# Patient Record
Sex: MALE | Race: WHITE | NOT HISPANIC OR LATINO | Employment: OTHER | URBAN - METROPOLITAN AREA
[De-identification: names, ages, dates, MRNs, and addresses within clinical notes are randomized per-mention and may not be internally consistent; named-entity substitution may affect disease eponyms.]

---

## 2017-01-01 ENCOUNTER — APPOINTMENT (OUTPATIENT)
Dept: GENERAL RADIOLOGY | Facility: HOSPITAL | Age: 67
End: 2017-01-01

## 2017-01-01 PROCEDURE — 71010 HC CHEST PA OR AP: CPT

## 2017-01-02 ENCOUNTER — APPOINTMENT (OUTPATIENT)
Dept: NEPHROLOGY | Facility: HOSPITAL | Age: 67
End: 2017-01-02
Attending: INTERNAL MEDICINE

## 2017-01-03 ENCOUNTER — APPOINTMENT (OUTPATIENT)
Dept: CT IMAGING | Facility: HOSPITAL | Age: 67
End: 2017-01-03

## 2017-01-03 PROCEDURE — 71250 CT THORAX DX C-: CPT

## 2017-01-04 ENCOUNTER — APPOINTMENT (OUTPATIENT)
Dept: CT IMAGING | Facility: HOSPITAL | Age: 67
End: 2017-01-04

## 2017-01-04 ENCOUNTER — APPOINTMENT (OUTPATIENT)
Dept: NEPHROLOGY | Facility: HOSPITAL | Age: 67
End: 2017-01-04
Attending: INTERNAL MEDICINE

## 2017-01-04 PROBLEM — J90 PLEURAL EFFUSION ON RIGHT: Status: ACTIVE | Noted: 2017-01-04

## 2017-01-04 PROCEDURE — 75989 ABSCESS DRAINAGE UNDER X-RAY: CPT

## 2017-01-06 ENCOUNTER — APPOINTMENT (OUTPATIENT)
Dept: GENERAL RADIOLOGY | Facility: HOSPITAL | Age: 67
End: 2017-01-06

## 2017-01-06 ENCOUNTER — APPOINTMENT (OUTPATIENT)
Dept: NEPHROLOGY | Facility: HOSPITAL | Age: 67
End: 2017-01-06
Attending: INTERNAL MEDICINE

## 2017-01-06 PROCEDURE — 71010 HC CHEST PA OR AP: CPT

## 2017-01-08 ENCOUNTER — APPOINTMENT (OUTPATIENT)
Dept: GENERAL RADIOLOGY | Facility: HOSPITAL | Age: 67
End: 2017-01-08

## 2017-01-08 PROCEDURE — 71010 HC CHEST PA OR AP: CPT

## 2017-01-09 ENCOUNTER — APPOINTMENT (OUTPATIENT)
Dept: NEPHROLOGY | Facility: HOSPITAL | Age: 67
End: 2017-01-09
Attending: INTERNAL MEDICINE

## 2017-01-10 ENCOUNTER — APPOINTMENT (OUTPATIENT)
Dept: GENERAL RADIOLOGY | Facility: HOSPITAL | Age: 67
End: 2017-01-10

## 2017-01-10 PROCEDURE — 71010 HC CHEST PA OR AP: CPT

## 2017-01-11 ENCOUNTER — APPOINTMENT (OUTPATIENT)
Dept: NEPHROLOGY | Facility: HOSPITAL | Age: 67
End: 2017-01-11
Attending: INTERNAL MEDICINE

## 2017-01-13 ENCOUNTER — APPOINTMENT (OUTPATIENT)
Dept: NEPHROLOGY | Facility: HOSPITAL | Age: 67
End: 2017-01-13
Attending: INTERNAL MEDICINE

## 2017-01-16 ENCOUNTER — APPOINTMENT (OUTPATIENT)
Dept: NEPHROLOGY | Facility: HOSPITAL | Age: 67
End: 2017-01-16
Attending: INTERNAL MEDICINE

## 2017-01-18 ENCOUNTER — APPOINTMENT (OUTPATIENT)
Dept: NEPHROLOGY | Facility: HOSPITAL | Age: 67
End: 2017-01-18
Attending: INTERNAL MEDICINE

## 2017-02-06 ENCOUNTER — OFFICE VISIT (OUTPATIENT)
Dept: FAMILY MEDICINE CLINIC | Facility: CLINIC | Age: 67
End: 2017-02-06

## 2017-02-06 VITALS
HEIGHT: 72 IN | SYSTOLIC BLOOD PRESSURE: 118 MMHG | TEMPERATURE: 97.6 F | HEART RATE: 69 BPM | DIASTOLIC BLOOD PRESSURE: 52 MMHG | OXYGEN SATURATION: 99 %

## 2017-02-06 DIAGNOSIS — I48.0 PAF (PAROXYSMAL ATRIAL FIBRILLATION) (HCC): Primary | ICD-10-CM

## 2017-02-06 DIAGNOSIS — I38 VHD (VALVULAR HEART DISEASE): ICD-10-CM

## 2017-02-06 DIAGNOSIS — I10 ESSENTIAL HYPERTENSION: ICD-10-CM

## 2017-02-06 DIAGNOSIS — I50.32 CHRONIC DIASTOLIC CONGESTIVE HEART FAILURE (HCC): ICD-10-CM

## 2017-02-06 DIAGNOSIS — E66.9 NON MORBID OBESITY, UNSPECIFIED OBESITY TYPE: ICD-10-CM

## 2017-02-06 DIAGNOSIS — N18.3 CKD (CHRONIC KIDNEY DISEASE), STAGE 3 (MODERATE): ICD-10-CM

## 2017-02-06 DIAGNOSIS — Z94.0 KIDNEY TRANSPLANT STATUS, LIVING RELATED DONOR: ICD-10-CM

## 2017-02-06 PROCEDURE — 99214 OFFICE O/P EST MOD 30 MIN: CPT | Performed by: FAMILY MEDICINE

## 2017-02-06 RX ORDER — MIDODRINE HYDROCHLORIDE 5 MG/1
5 TABLET ORAL 3 TIMES DAILY
Qty: 90 TABLET | Refills: 11
Start: 2017-02-06

## 2017-02-06 NOTE — PROGRESS NOTES
Subjective   Darell Rizo is a 66 y.o. male    HPI Comments: Patient is here for hospital follow-up after admission beginning 12/27/16 through 1/18/17.  His discharge diagnoses included acute on chronic congestive heart failure right pleural effusion, anemia of chronic renal failure, right lung pneumonia, atypical chest pain, acute on chronic respiratory failure, acute kidney injury superimposed on chronic kidney disease stage III, valvular heart disease, type 2 diabetes and previous renal transplant.  Extensive hospitalization with multiple studies and procedures.  The patient is back on hemodialysis through an external catheter.  He dialyzes in [facility] on Tuesday Thursdays and Saturdays.  It basically sounds as if his valvular heart disease is causing heart failure.  His renal function deteriorated because of this and also related to his heart catheterization.  He reports that the oxygen supply company tried to take his oxygen away due to some miscommunication.  He was told that he needed to have his oxygen levels checked here in the office when he was not on oxygen and also after ambulating.  We will do this test today however he does not have his walker and is still quite weak from his prolonged hospitalization and illness.  They need refills on one of his prescriptions they have follow-ups with all of his specialists already arranged.    Hospital records, diagnostic procedures and medication list reviewed today.    Oxygen saturation with O2 99%, without oxygen 96% and 93% after ambulating 50 feet.    Shortness of Breath   Pertinent negatives include no chest pain, headaches, leg swelling, rash, vomiting or wheezing.       The following portions of the patient's history were reviewed and updated as appropriate: allergies, current medications, past social history and problem list    Review of Systems   Constitutional: Negative for appetite change, diaphoresis, fatigue and unexpected weight change.    HENT: Negative.  Negative for postnasal drip and sinus pressure.    Eyes: Negative.  Negative for visual disturbance.   Respiratory: Positive for shortness of breath. Negative for cough, choking, chest tightness and wheezing.    Cardiovascular: Negative for chest pain, palpitations and leg swelling.   Gastrointestinal: Negative for diarrhea, nausea and vomiting.   Endocrine: Negative for polydipsia, polyphagia and polyuria.   Genitourinary: Positive for decreased urine volume. Negative for difficulty urinating, dysuria, frequency and hematuria.   Musculoskeletal: Positive for arthralgias, back pain, gait problem and myalgias.   Skin: Negative for color change and rash.   Neurological: Negative for dizziness, syncope, weakness, light-headedness, numbness and headaches.   Hematological: Negative for adenopathy. Does not bruise/bleed easily.   Psychiatric/Behavioral: Positive for dysphoric mood. The patient is not nervous/anxious.        Objective     Vitals:    02/06/17 1037   BP: 118/52   Pulse: 69   Temp: 97.6 °F (36.4 °C)   SpO2: 99%       Physical Exam   Constitutional: He is oriented to person, place, and time. He appears well-developed and well-nourished.   HENT:   Head: Normocephalic and atraumatic.   Eyes: Conjunctivae are normal. Pupils are equal, round, and reactive to light.   Neck: Neck supple. No JVD present. No thyromegaly present.   Cardiovascular: Normal rate, regular rhythm and normal pulses.  Exam reveals no gallop and no friction rub.    Murmur heard.  Pulmonary/Chest: Effort normal and breath sounds normal. No respiratory distress. He has no wheezes. He has no rales.   Abdominal: Soft. Bowel sounds are normal. There is no hepatosplenomegaly. There is no tenderness.   Musculoskeletal: He exhibits no edema or deformity.        Lumbar back: He exhibits decreased range of motion, tenderness, bony tenderness and pain. He exhibits no swelling, no deformity and no spasm.   Lymphadenopathy:     He has no  cervical adenopathy.   Neurological: He is alert and oriented to person, place, and time. He has normal reflexes. No sensory deficit.   Skin: Skin is warm and dry. He is not diaphoretic.   Psychiatric: He has a normal mood and affect. His behavior is normal.   Nursing note and vitals reviewed.      Assessment/Plan   Problem List Items Addressed This Visit        Cardiovascular and Mediastinum    VHD (Nov 2016 mod/sev MS, mod MR and mod AS, EF 65%)    Relevant Medications    midodrine (PROAMATINE) 5 MG tablet       Other    Living related donor kidney transplant 2013-now oliguric renal failure      Other Visit Diagnoses     PAF (paroxysmal atrial fibrillation)    -  Primary    Relevant Medications    midodrine (PROAMATINE) 5 MG tablet    Chronic diastolic congestive heart failure        Relevant Medications    midodrine (PROAMATINE) 5 MG tablet    Non morbid obesity, unspecified obesity type        CKD (chronic kidney disease), stage 3 (moderate)        Essential hypertension        Relevant Medications    midodrine (PROAMATINE) 5 MG tablet        O2 sats: with O2 99%, at rest without O2 96%, with short distance ambulation 93%

## 2017-02-22 ENCOUNTER — TELEPHONE (OUTPATIENT)
Dept: FAMILY MEDICINE CLINIC | Facility: CLINIC | Age: 67
End: 2017-02-22

## 2017-04-03 ENCOUNTER — CLINICAL SUPPORT NO REQUIREMENTS (OUTPATIENT)
Dept: CARDIOLOGY | Facility: CLINIC | Age: 67
End: 2017-04-03

## 2017-04-03 DIAGNOSIS — R00.1 BRADYCARDIA: ICD-10-CM

## 2017-04-03 PROCEDURE — 93296 REM INTERROG EVL PM/IDS: CPT | Performed by: INTERNAL MEDICINE

## 2017-04-03 PROCEDURE — 93294 REM INTERROG EVL PM/LDLS PM: CPT | Performed by: INTERNAL MEDICINE

## 2017-05-30 ENCOUNTER — TELEPHONE (OUTPATIENT)
Dept: CARDIOLOGY | Facility: CLINIC | Age: 67
End: 2017-05-30

## 2017-06-15 ENCOUNTER — OFFICE VISIT (OUTPATIENT)
Dept: CARDIOLOGY | Facility: CLINIC | Age: 67
End: 2017-06-15

## 2017-06-15 VITALS
HEIGHT: 72 IN | DIASTOLIC BLOOD PRESSURE: 74 MMHG | WEIGHT: 228.8 LBS | BODY MASS INDEX: 30.99 KG/M2 | SYSTOLIC BLOOD PRESSURE: 112 MMHG | HEART RATE: 70 BPM

## 2017-06-15 DIAGNOSIS — I10 ESSENTIAL HYPERTENSION: ICD-10-CM

## 2017-06-15 DIAGNOSIS — I48.0 PAROXYSMAL ATRIAL FIBRILLATION (HCC): ICD-10-CM

## 2017-06-15 DIAGNOSIS — E78.2 MIXED HYPERLIPIDEMIA: ICD-10-CM

## 2017-06-15 DIAGNOSIS — I44.30 AV BLOCK: ICD-10-CM

## 2017-06-15 DIAGNOSIS — I25.10 CORONARY ARTERY DISEASE INVOLVING NATIVE CORONARY ARTERY OF NATIVE HEART WITHOUT ANGINA PECTORIS: Primary | ICD-10-CM

## 2017-06-15 PROCEDURE — 99214 OFFICE O/P EST MOD 30 MIN: CPT | Performed by: INTERNAL MEDICINE

## 2017-06-15 RX ORDER — ISOSORBIDE MONONITRATE 30 MG/1
30 TABLET, EXTENDED RELEASE ORAL DAILY
Qty: 30 TABLET | Refills: 11 | Status: SHIPPED | OUTPATIENT
Start: 2017-06-15

## 2017-06-15 NOTE — PROGRESS NOTES
[facility]  Office Progress Note  Darell Rizo  1950  [phone]      Visit Date: 06/15/2017    PCP: Reza Goodwin MD  [id number]  [place] [id number]    IDENTIFICATION: A 66 y.o. male from [facility].    Chief Complaint   Patient presents with   • Slow Heart Rate     Follow up    • Atrial Fibrillation       PROBLEM LIST:   1.  coronary disease:         A. 12/16 40% LM, diffuse nonobst plaque otherwise  2. Symptomatic bradycardia:  a. January 2016: Biotronik dual-chamber pacemaker per Jacob Barrios MD.  3. PAF:  a. August 2011: Echocardiogram with normal LVEF, mild LVH. Aortic sclerosis with mild MR. Left atrium 4.4 cm.   b. April 2014: Echocardiogram with mild AS, mean gradient 19, RVSP 32.  4. Chronic kidney disease on peritoneal dialysis as of August 2011:  a. June 2013: Renal transplant at the [facility].   b. HD restarted 12/16  5. Lumbar disk disease with abscessed discitis in 2009, with discectomy, MRSA sepsis.   6. Type 1 diabetes mellitus, severe diabetic neuropathy.  7. VHD      A. 12/16 echo mod AS 51/29 echo, LHC pull back 35mmHg.                             Mod MS 17/10 at HR 70      B. Pulm Htn 12/16 rhc PA 60/21 mean 36   8. Lower extremity Charcot foot deformity.   9. Severe peripheral neuropathy with orthostasis.   10. Dyslipidemia.  11. Labile hypertension.  12. Nephrolithiasis, remote.   Allergies  Allergies   Allergen Reactions   • Ceftriaxone Anaphylaxis     Occurred 9/2015- relayed by ID physician  Tolerated cefepime 10/2015   • Hydrocodone-Ibuprofen Itching   • Ibuprofen    • Mycophenolate Mofetil Nausea Only and Other (See Comments)     Loss of appetite  Failure to thrive       Current Medications    Current Outpatient Prescriptions:   •  acetaminophen (TYLENOL) 500 MG tablet, Take 500 mg by mouth every 6 (six) hours., Disp: , Rfl:   •  aspirin 81 MG chewable tablet, Chew 81 mg Daily., Disp:  , Rfl:   •  atorvastatin (LIPITOR) 80 MG tablet, Take 80 mg by mouth every night., Disp: , Rfl:   •  azaTHIOprine (IMURAN) 50 MG tablet, Take 75 mg by mouth Daily., Disp: , Rfl:   •  B Complex-C-Folic Acid (MARTIN-PRASANNA PO), Take  by mouth Daily., Disp: , Rfl:   •  Docusate Sodium (STOOL SOFTENER) 100 MG capsule, Take 100 mg by mouth daily., Disp: , Rfl:   •  epoetin lm (EPOGEN,PROCRIT) 10566 UNIT/ML injection, Inject 1 mL under the skin 3 (Three) Times a Week. Indications: ESRD on Dialysis, Disp: , Rfl:   •  ferrous sulfate 325 (65 FE) MG tablet, Take 1 tablet by mouth Daily With Breakfast., Disp: 30 tablet, Rfl: 11  •  folic acid-pyridoxine-cyanocobalamin (FOLBIC) 2.5-25-2 MG tablet tablet, Take 1 tablet by mouth Daily., Disp: 30 each, Rfl: 11  •  gabapentin (NEURONTIN) 300 MG capsule, Take 300 mg by mouth 4 (Four) Times a Day., Disp: , Rfl:   •  HUMALOG KWIKPEN 100 UNIT/ML solution pen-injector, 30 Units See Admin Instructions. Sliding scale insulin before each meal (max of 45 units at each dose), Disp: , Rfl:   •  insulin glargine (LANTUS) 100 UNIT/ML injection, Inject 20 Units under the skin Every Morning. (Patient taking differently: Inject 22 Units under the skin 2 (Two) Times a Day.), Disp: , Rfl:   •  LORazepam (ATIVAN) 0.5 MG tablet, Take 0.5 mg by mouth Daily As Needed for anxiety., Disp: , Rfl:   •  midodrine (PROAMATINE) 5 MG tablet, Take 1 tablet by mouth 3 (Three) Times a Day., Disp: 90 tablet, Rfl: 11  •  nitroglycerin (NITROSTAT) 0.4 MG SL tablet, Place 1 tablet under the tongue 1 (One) Time As Needed for chest pain. Take no more than 3 doses in 15 minutes., Disp: 25 tablet, Rfl: 6  •  ondansetron (ZOFRAN) 4 MG tablet, Take 8 mg by mouth Every 6 (Six) Hours As Needed for nausea or vomiting., Disp: , Rfl:   •  oxyCODONE (ROXICODONE) 5 MG immediate release tablet, Take 10 mg by mouth Every 8 (Eight) Hours As Needed for moderate pain (4-6)., Disp: , Rfl:   •  tacrolimus (PROGRAF) 0.5 MG capsule, Take 1  "mg by mouth 2 (Two) Times a Day., Disp: , Rfl:       History of Present Illness     Pt notes atypical  chest pain typically occurs at rest and on days prior to dialysis days.  He states he has taken nitroglycerin with equivocal improvement notes pursed lipped breathing can improve his symptoms.  He also notes that on his lawn tractor significant vibration tends to cause chest discomfort.  He has had no issues during dialysis with chest pain and recently had AV fistula changed with no issue.      ROS:  All systems have been reviewed and are negative with the exception of those mentioned in the HPI.    OBJECTIVE:  Vitals:    06/15/17 1355   BP: 112/74   BP Location: Right arm   Patient Position: Sitting   Pulse: 70   Weight: 228 lb 12.8 oz (104 kg)   Height: 72\" (182.9 cm)     Physical Exam   Constitutional: He appears well-developed and well-nourished.   Neck: Normal range of motion. Neck supple. No hepatojugular reflux and no JVD present. Carotid bruit is not present. No tracheal deviation present. No thyromegaly present.   Cardiovascular: Normal rate, regular rhythm, S1 normal, S2 normal, intact distal pulses and normal pulses.  PMI is not displaced.  Exam reveals no gallop, no distant heart sounds, no friction rub, no midsystolic click and no opening snap.    Murmur heard.   Harsh midsystolic murmur is present with a grade of 2/6  at the upper right sternal border radiating to the neck  Pulses:       Radial pulses are 2+ on the right side, and 2+ on the left side.        Dorsalis pedis pulses are 2+ on the right side, and 2+ on the left side.        Posterior tibial pulses are 2+ on the right side, and 2+ on the left side.   Pulmonary/Chest: Effort normal and breath sounds normal. He has no wheezes. He has no rales.   Abdominal: Soft. Bowel sounds are normal. He exhibits no mass. There is no tenderness. There is no guarding.       Diagnostic Data:  Procedures      ASSESSMENT:   Diagnosis Plan   1. Coronary artery " disease involving native coronary artery of native heart without angina pectoris     2. Paroxysmal atrial fibrillation     3. Essential hypertension     4. Mixed hyperlipidemia     5. AV block         PLAN:  Anginal equivalent with moderate nonobstructive plaque noted 1216 will escalate medical management with the addition of isosorbide mononitrate 30 mg daily.    Valvular heart disease with moderate AS/mS echocardiogram be obtained 1217    CK D on dialysis    Hypertension controlled on current regimen    Dyslipidemia on statin therapy    Insulin-requiring diabetes- for upcoming A1c    Reza Goodwin MD, thank you for referring Mr. Rizo for evaluation.  I have forwarded my electronically generated recommendations to you for review.  Please do not hesitate to call with any questions.      Kenneth Irene MD, FACC